# Patient Record
Sex: FEMALE | Race: WHITE | NOT HISPANIC OR LATINO | Employment: OTHER | URBAN - METROPOLITAN AREA
[De-identification: names, ages, dates, MRNs, and addresses within clinical notes are randomized per-mention and may not be internally consistent; named-entity substitution may affect disease eponyms.]

---

## 2023-01-19 ENCOUNTER — HOSPITAL ENCOUNTER (EMERGENCY)
Facility: HOSPITAL | Age: 53
Discharge: LEFT BEFORE DISCHARGE | End: 2023-01-19
Payer: OTHER GOVERNMENT

## 2023-01-19 VITALS
SYSTOLIC BLOOD PRESSURE: 109 MMHG | DIASTOLIC BLOOD PRESSURE: 75 MMHG | HEART RATE: 102 BPM | OXYGEN SATURATION: 98 % | TEMPERATURE: 99 F | RESPIRATION RATE: 20 BRPM

## 2023-01-19 DIAGNOSIS — R06.02 SOB (SHORTNESS OF BREATH): ICD-10-CM

## 2023-01-19 PROCEDURE — 93005 ELECTROCARDIOGRAM TRACING: CPT

## 2023-01-19 PROCEDURE — 99900 PR LEFT WITHOUTBEING SEEN-EMERGENCY: CPT | Mod: ,,, | Performed by: EMERGENCY MEDICINE

## 2023-01-19 PROCEDURE — 93010 EKG 12-LEAD: ICD-10-PCS | Mod: ,,, | Performed by: INTERNAL MEDICINE

## 2023-01-19 PROCEDURE — 99283 EMERGENCY DEPT VISIT LOW MDM: CPT

## 2023-01-19 PROCEDURE — 93010 ELECTROCARDIOGRAM REPORT: CPT | Mod: ,,, | Performed by: INTERNAL MEDICINE

## 2023-01-19 PROCEDURE — 99900 PR LEFT WITHOUTBEING SEEN-EMERGENCY: ICD-10-PCS | Mod: ,,, | Performed by: EMERGENCY MEDICINE

## 2023-01-20 NOTE — PROVIDER PROGRESS NOTES - EMERGENCY DEPT.
Encounter Date: 1/19/2023    ED Physician Progress Notes        Physician Note:   52-year-old female with past medical history of SVT, anxiety, PTSD, chronic neck pain presented to the emergency department with shortness of breath.  She had associated chest pain that radiated to her back and right shoulder pain.  She believes she was having a panic attack as symptoms started while transporting her uncle to the emergency department.  After being informed that her uncle was stable, she noted improvement in symptoms.  She stated that she would not like to be treated for her symptoms at this time.  I offered her full treatment and evaluation for chest pain and rectal bleeding she had yesterday.  Patient declined all treatment and evaluation at this time against medical advice.  No formal physical exam was able to be performed as patient asked to leave. Informed of risks such as heart attack, CVA, other cardiopulmonary conditions, and death.  AMA forms were signed and witnessed by nursing staff.      Louisa Awan PA-C

## 2023-01-20 NOTE — ED TRIAGE NOTES
Maricarmen Lowe, a 52 y.o. female presents to the ED w/ complaint of SOB. Patient states that she thinks she is having a panic attack, feeling SOB, and brought to the ED via EMS. Upon triage patient became calmer, no longer SOB.    Adult Physical Assessment  LOC: Maricarmen Lowe, 52 y.o. female verified via two identifiers.  The patient is awake, alert, oriented and speaking appropriately at this time.  APPEARANCE: Patient resting comfortably and appears to be in no acute distress at this time. Patient is clean and well groomed, patient's clothing is properly fastened.  SKIN:The skin is warm and dry, color consistent with ethnicity, patient has normal skin turgor and moist mucus membranes, skin intact, no breakdown or brusing noted.  MUSCULOSKELETAL: Patient moving all extremities well, no obvious swelling or deformities noted.  RESPIRATORY: Airway is open and patent, respirations are spontaneous, patient has a normal effort and rate, no accessory muscle use noted. Patient reports to the ED with SOB. Patient states that she thinks she is having a panic attack, feeling SOB, and brought to the ED via EMS. Upon triage pt became calmer, no longer SOB.  CARDIAC: Patient has a normal rate and rhythm, no periphreal edema noted in any extremity, capillary refill < 3 seconds in all extremities  ABDOMEN: Soft and non tender to palpation, no abdominal distention noted. Bowel sounds present in all four quadrants.  NEUROLOGIC: Eyes open spontaneously, behavior appropriate to situation, follows commands, facial expression symmetrical, bilateral hand grasp equal and even, purposeful motor response noted, normal sensation in all extremities when touched with a finger.      Triage note:  Chief Complaint   Patient presents with    Shortness of Breath     Pt states feels like she is having panic attack, feeling sob, states family member was just brought to ED by EMS. Upon triage pt calmer and no longer feeling sob.     Review of patient's  allergies indicates:  No Known Allergies  History reviewed. No pertinent past medical history.
